# Patient Record
Sex: FEMALE | Race: BLACK OR AFRICAN AMERICAN | NOT HISPANIC OR LATINO | Employment: STUDENT | URBAN - METROPOLITAN AREA
[De-identification: names, ages, dates, MRNs, and addresses within clinical notes are randomized per-mention and may not be internally consistent; named-entity substitution may affect disease eponyms.]

---
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
## 2025-03-21 ENCOUNTER — LAB VISIT (OUTPATIENT)
Dept: LAB | Facility: HOSPITAL | Age: 20
End: 2025-03-21
Payer: MEDICAID

## 2025-03-21 ENCOUNTER — OFFICE VISIT (OUTPATIENT)
Dept: OBSTETRICS AND GYNECOLOGY | Facility: CLINIC | Age: 20
End: 2025-03-21
Payer: MEDICAID

## 2025-03-21 VITALS
SYSTOLIC BLOOD PRESSURE: 118 MMHG | BODY MASS INDEX: 31.61 KG/M2 | HEART RATE: 63 BPM | DIASTOLIC BLOOD PRESSURE: 86 MMHG | HEIGHT: 68 IN | WEIGHT: 208.56 LBS

## 2025-03-21 DIAGNOSIS — Z97.5 NEXPLANON IN PLACE: ICD-10-CM

## 2025-03-21 DIAGNOSIS — Z70.8 ENCOUNTER FOR SEXUALLY TRANSMITTED INFECTION COUNSELING: ICD-10-CM

## 2025-03-21 DIAGNOSIS — Z11.3 SCREENING EXAMINATION FOR VENEREAL DISEASE: Primary | ICD-10-CM

## 2025-03-21 DIAGNOSIS — Z70.8 ENCOUNTER FOR SEXUAL HEALTH EDUCATION: ICD-10-CM

## 2025-03-21 DIAGNOSIS — Z11.3 SCREENING EXAMINATION FOR VENEREAL DISEASE: ICD-10-CM

## 2025-03-21 DIAGNOSIS — B96.89 BACTERIAL VAGINOSIS: ICD-10-CM

## 2025-03-21 DIAGNOSIS — Z30.09 ENCOUNTER FOR OTHER GENERAL COUNSELING OR ADVICE ON CONTRACEPTION: ICD-10-CM

## 2025-03-21 DIAGNOSIS — N76.0 BACTERIAL VAGINOSIS: ICD-10-CM

## 2025-03-21 LAB
HBV SURFACE AG SERPL QL IA: NORMAL
HCV AB SERPL QL IA: NORMAL
HIV 1+2 AB+HIV1 P24 AG SERPL QL IA: NORMAL
TREPONEMA PALLIDUM IGG+IGM AB [PRESENCE] IN SERUM OR PLASMA BY IMMUNOASSAY: NONREACTIVE

## 2025-03-21 PROCEDURE — 87491 CHLMYD TRACH DNA AMP PROBE: CPT

## 2025-03-21 PROCEDURE — 1159F MED LIST DOCD IN RCRD: CPT | Mod: CPTII,,,

## 2025-03-21 PROCEDURE — 81515 NFCT DS BV&VAGINITIS DNA ALG: CPT

## 2025-03-21 PROCEDURE — 3074F SYST BP LT 130 MM HG: CPT | Mod: CPTII,,,

## 2025-03-21 PROCEDURE — 87340 HEPATITIS B SURFACE AG IA: CPT

## 2025-03-21 PROCEDURE — 99212 OFFICE O/P EST SF 10 MIN: CPT | Mod: PBBFAC

## 2025-03-21 PROCEDURE — 99213 OFFICE O/P EST LOW 20 MIN: CPT | Mod: S$PBB,UC,,

## 2025-03-21 PROCEDURE — 86803 HEPATITIS C AB TEST: CPT

## 2025-03-21 PROCEDURE — 86593 SYPHILIS TEST NON-TREP QUANT: CPT

## 2025-03-21 PROCEDURE — 99999 PR PBB SHADOW E&M-EST. PATIENT-LVL II: CPT | Mod: PBBFAC,,,

## 2025-03-21 PROCEDURE — 87389 HIV-1 AG W/HIV-1&-2 AB AG IA: CPT

## 2025-03-21 PROCEDURE — 36415 COLL VENOUS BLD VENIPUNCTURE: CPT

## 2025-03-21 PROCEDURE — 3008F BODY MASS INDEX DOCD: CPT | Mod: CPTII,,,

## 2025-03-21 PROCEDURE — 3079F DIAST BP 80-89 MM HG: CPT | Mod: CPTII,,,

## 2025-03-21 NOTE — PROGRESS NOTES
Subjective:       Patient ID: Glenroy Lamb is a 19 y.o. female.    Chief Complaint:  STD CHECK (Patient denies symptoms)      History of Present Illness  Pt presents to clinic with hx of not using condoms and new sexual partners with unknown sexual history. Pt unsure of what S&S to look for with STI and unsure which ones can be prevented with condom use. Pt has a nexplanon in place and knows it expires this year but unsure of what month. Would like to take this one out and replace it with another. Pt denies dysuria and abnormal odor or dx but would like both blood work and vaginal swabs for std testing.          GYN & OB History  Patient's last menstrual period was 2025 (approximate).   Date of Last Pap: 3/8/2022    OB History    Para Term  AB Living   1 1 1   1   SAB IAB Ectopic Multiple Live Births      0 1      # Outcome Date GA Lbr Oliver/2nd Weight Sex Type Anes PTL Lv   1 Term 22 37w2d / 00:36 3.045 kg (6 lb 11.4 oz) M Vag-Spont EPI N RICH      Birth Comments:  RR 50 at 1 minute of life. Matching ID bracelets placed on infant x2, mother, and father       Review of Systems  Review of Systems   Constitutional:  Positive for activity change.   HENT: Negative.     Eyes: Negative.    Respiratory: Negative.     Cardiovascular: Negative.    Gastrointestinal: Negative.    Genitourinary: Negative.    Musculoskeletal: Negative.    Skin: Negative.    Neurological: Negative.    Psychiatric/Behavioral: Negative.             Objective:    Physical Exam:   Constitutional: She is oriented to person, place, and time. She appears well-developed and well-nourished. She is cooperative. She does not appear ill. No distress.    HENT:   Head: Normocephalic.    Eyes: Conjunctivae and EOM are normal.     Cardiovascular:  Normal rate, regular rhythm and normal heart sounds.             Pulmonary/Chest: Effort normal. No respiratory distress.        Abdominal: Soft. Bowel sounds are normal. She exhibits  no distension. There is no abdominal tenderness. There is no rebound and no guarding.     Genitourinary:    Vagina and rectum normal.      Pelvic exam was performed with patient supine.   There is no rash or lesion on the right labia. There is no rash or lesion on the left labia. Right adnexum displays no mass and no tenderness. Left adnexum displays no mass and no tenderness. No vaginal discharge or tenderness in the vagina. Cervix exhibits no motion tenderness. Uterus is not enlarged and not tender.    Genitourinary Comments: Upon speculum exam vaginal dx appears normal and physiologic, clear to white mucus.              Musculoskeletal: Normal range of motion.       Neurological: She is alert and oriented to person, place, and time.    Skin: Skin is warm and dry. No rash noted.    Psychiatric: She has a normal mood and affect. Her speech is normal and behavior is normal. Mood, affect, judgment and thought content normal.          Assessment:        1. Screening examination for venereal disease    2. Encounter for sexually transmitted infection counseling    3. Encounter for sexual health education    4. Encounter for other general counseling or advice on contraception    5. Nexplanon in place               Plan:      Glenroy was seen today for std check.    Diagnoses and all orders for this visit:    Screening examination for venereal disease  -     Vaginosis Screen by DNA Probe; Future  -     C. trachomatis/N. gonorrhoeae by AMP DNA [facility]; Cervix  -     Vaginosis Screen by DNA Probe  -     HIV 1/2 Ag/Ab (4th Gen); Future  -     Hepatitis B Surface Antigen; Standing  -     Hepatitis C Antibody; Future  -     Treponema Pallidium Antibodies IgG, IgM; Standing    Encounter for sexually transmitted infection counseling    Encounter for sexual health education    Encounter for other general counseling or advice on contraception    Nexplanon in place      Will order and schedule removal and reinsertion of  nexplanon.    Will contact pt in regards of sti testing results and tx accordingly.    Pt was counseled on proper vaginal health and hygiene as well as STI prevention with condom use.

## 2025-03-22 LAB
BACTERIAL VAGINOSIS DNA: DETECTED
C TRACH DNA SPEC QL NAA+PROBE: NOT DETECTED
CANDIDA GLABRATA/KRUSEI: NOT DETECTED
CANDIDA RRNA VAG QL PROBE: NOT DETECTED
N GONORRHOEA DNA SPEC QL NAA+PROBE: NOT DETECTED
TRICHOMONAS VAGINALIS: NOT DETECTED

## 2025-03-24 RX ORDER — METRONIDAZOLE 500 MG/1
500 TABLET ORAL EVERY 12 HOURS
Qty: 14 TABLET | Refills: 0 | Status: SHIPPED | OUTPATIENT
Start: 2025-03-24 | End: 2025-03-31

## 2025-04-08 ENCOUNTER — TELEPHONE (OUTPATIENT)
Dept: OBSTETRICS AND GYNECOLOGY | Facility: CLINIC | Age: 20
End: 2025-04-08
Payer: MEDICAID

## 2025-04-08 DIAGNOSIS — Z30.46 ENCOUNTER FOR REMOVAL AND REINSERTION OF NEXPLANON: Primary | ICD-10-CM

## 2025-04-08 NOTE — TELEPHONE ENCOUNTER
----- Message from Nurse Arroyo sent at 4/8/2025  2:11 PM CDT -----  Patient needs to be scheduled for Nexplanon removal and reinsertion.

## 2025-06-29 ENCOUNTER — HOSPITAL ENCOUNTER (EMERGENCY)
Facility: HOSPITAL | Age: 20
Discharge: HOME | End: 2025-06-29
Attending: SURGERY
Payer: MEDICAID

## 2025-06-29 VITALS
HEIGHT: 68 IN | BODY MASS INDEX: 32.18 KG/M2 | OXYGEN SATURATION: 100 % | HEART RATE: 75 BPM | WEIGHT: 212.31 LBS | DIASTOLIC BLOOD PRESSURE: 100 MMHG | TEMPERATURE: 98 F | SYSTOLIC BLOOD PRESSURE: 149 MMHG | RESPIRATION RATE: 18 BRPM

## 2025-06-29 DIAGNOSIS — Z20.2 POSSIBLE EXPOSURE TO STD: Primary | ICD-10-CM

## 2025-06-29 LAB
B-HCG UR QL: NEGATIVE
BILIRUB UR QL STRIP.AUTO: NEGATIVE
CLARITY UR: ABNORMAL
COLOR UR AUTO: YELLOW
GLUCOSE UR QL STRIP: NEGATIVE
HGB UR QL STRIP: NEGATIVE
KETONES UR QL STRIP: NEGATIVE
LEUKOCYTE ESTERASE UR QL STRIP: NEGATIVE
NITRITE UR QL STRIP: NEGATIVE
PH UR STRIP: 7 [PH]
PROT UR QL STRIP: NEGATIVE
SP GR UR STRIP: 1.02
UROBILINOGEN UR STRIP-ACNC: 1 EU/DL

## 2025-06-29 PROCEDURE — 81025 URINE PREGNANCY TEST: CPT | Performed by: NURSE PRACTITIONER

## 2025-06-29 PROCEDURE — 81003 URINALYSIS AUTO W/O SCOPE: CPT | Performed by: NURSE PRACTITIONER

## 2025-06-29 PROCEDURE — 81515 NFCT DS BV&VAGINITIS DNA ALG: CPT | Performed by: NURSE PRACTITIONER

## 2025-06-29 PROCEDURE — 87491 CHLMYD TRACH DNA AMP PROBE: CPT | Performed by: NURSE PRACTITIONER

## 2025-06-29 PROCEDURE — 96372 THER/PROPH/DIAG INJ SC/IM: CPT | Performed by: NURSE PRACTITIONER

## 2025-06-29 PROCEDURE — 63600175 PHARM REV CODE 636 W HCPCS: Performed by: NURSE PRACTITIONER

## 2025-06-29 PROCEDURE — 99284 EMERGENCY DEPT VISIT MOD MDM: CPT | Mod: 25

## 2025-06-29 RX ORDER — DOXYCYCLINE 100 MG/1
100 CAPSULE ORAL 2 TIMES DAILY
Qty: 20 CAPSULE | Refills: 0 | Status: SHIPPED | OUTPATIENT
Start: 2025-06-29 | End: 2025-07-09

## 2025-06-29 RX ORDER — CEFTRIAXONE 1 G/1
1 INJECTION, POWDER, FOR SOLUTION INTRAMUSCULAR; INTRAVENOUS
Status: COMPLETED | OUTPATIENT
Start: 2025-06-29 | End: 2025-06-29

## 2025-06-29 RX ORDER — FLUCONAZOLE 150 MG/1
150 TABLET ORAL ONCE
Qty: 1 TABLET | Refills: 0 | Status: SHIPPED | OUTPATIENT
Start: 2025-06-29 | End: 2025-06-29

## 2025-06-29 RX ORDER — METRONIDAZOLE 500 MG/1
500 TABLET ORAL EVERY 12 HOURS
Qty: 14 TABLET | Refills: 0 | Status: SHIPPED | OUTPATIENT
Start: 2025-06-29 | End: 2025-07-06

## 2025-06-29 RX ADMIN — CEFTRIAXONE SODIUM 1 G: 1 INJECTION, POWDER, FOR SOLUTION INTRAMUSCULAR; INTRAVENOUS at 05:06

## 2025-06-29 NOTE — ED PROVIDER NOTES
Encounter Date: 2025       History     Chief Complaint   Patient presents with    Vaginal Discharge    Vaginal Itching     Glenroy Lamb is a 19 y.o. female with no significant PMH presenting to the ED for evaluation of vaginal discharge.  Patient presents with a 4 day history of a thin, white vaginal discharge with odor.  She is concerned that she has bacterial vaginosis.  She is also requesting STD testing for chlamydia and gonorrhea due to recent sexual intercourse without protection.  She denies vaginal lesions or pain.  Denies abdominal pain.  She does not believe that she is pregnant.    The history is provided by the patient.     Review of patient's allergies indicates:  No Known Allergies  Past Medical History:   Diagnosis Date    History of 2019 novel coronavirus disease (COVID-19) 2022    Pre-eclampsia in third trimester 2022    Pregnant          History reviewed. No pertinent surgical history.  Family History   Problem Relation Name Age of Onset    Heart disease Maternal Grandmother      Diabetes Maternal Grandmother      Stroke Maternal Grandfather      Heart disease Mother          PALPITATIONS    Breast cancer Neg Hx      Colon cancer Neg Hx      Ovarian cancer Neg Hx       Social History[1]  Review of Systems   Constitutional:  Negative for activity change, chills and fever.   HENT:  Negative for congestion, ear discharge, ear pain, postnasal drip, sinus pressure, sinus pain and sore throat.    Respiratory:  Negative for cough, chest tightness and shortness of breath.    Cardiovascular:  Negative for chest pain.   Gastrointestinal:  Negative for abdominal distention, abdominal pain and nausea.   Genitourinary:  Positive for vaginal discharge. Negative for dysuria, frequency and urgency.   Musculoskeletal:  Negative for back pain.   Skin:  Negative for rash.   Neurological:  Negative for dizziness, weakness, light-headedness and numbness.   Hematological:  Does not bruise/bleed  easily.       Physical Exam     Initial Vitals [06/29/25 1628]   BP Pulse Resp Temp SpO2   (!) 148/91 83 18 98.2 °F (36.8 °C) 98 %      MAP       --         Physical Exam    Nursing note and vitals reviewed.  Constitutional: She appears well-developed and well-nourished.   HENT:   Head: Normocephalic and atraumatic.   Eyes: Conjunctivae and EOM are normal. Pupils are equal, round, and reactive to light.   Neck: Neck supple.   Cardiovascular:  Normal rate, regular rhythm, normal heart sounds and intact distal pulses.           Pulmonary/Chest: Breath sounds normal.   Abdominal: Abdomen is soft. Bowel sounds are normal.   Musculoskeletal:         General: Normal range of motion.      Cervical back: Neck supple.     Neurological: She is alert and oriented to person, place, and time. She has normal strength.   Skin: Skin is warm and dry.   Psychiatric: She has a normal mood and affect. Her behavior is normal. Judgment and thought content normal.         ED Course   Procedures  Labs Reviewed   URINALYSIS, REFLEX TO URINE CULTURE - Abnormal       Result Value    Color, UA Yellow      Appearance, UA Hazy (*)     pH, UA 7.0      Spec Grav UA 1.025      Protein, UA Negative      Glucose, UA Negative      Ketones, UA Negative      Bilirubin, UA Negative      Blood, UA Negative      Nitrites, UA Negative      Urobilinogen, UA 1.0      Leukocyte Esterase, UA Negative     PREGNANCY TEST, URINE RAPID - Normal    hCG Qualitative, Urine Negative     C. TRACHOMATIS/N. GONORRHOEAE BY AMP DNA   VAGINOSIS SCREEN BY DNA PROBE   GREY TOP URINE HOLD          Imaging Results    None          Medications   cefTRIAXone injection 1 g (1 g Intramuscular Given 6/29/25 1720)     Medical Decision Making  Evaluation of a 19-year-old female presenting with vaginal discharge with odor and concerns for STD.  Presents nontoxic appearing with stable vital signs.  Abdomen is soft and non tender   exam deferred due to patient request    Differential  diagnosis includes bacterial vaginosis, vaginitis, yeast, STD    Amount and/or Complexity of Data Reviewed  Labs: ordered. Decision-making details documented in ED Course.    Risk  Prescription drug management.  Risk Details: Stable for discharge home.  Negative urinalysis.  Gonorrhea and chlamydia pending in addition to vaginosis screening.  Given symptoms, will discharge with Flagyl, doxycycline, and Diflucan.  Patient also given Rocephin injection to cover chlamydia/gonorrhea infection.  Patient instructed to avoid sexual intercourse pending results and treatment. Patient/caregiver voices understanding and feels comfortable with discharge plan.      The patient acknowledges that close follow up with medical provider is required. Instructed to follow up with PCP within 2 days. Patient was given specific return precautions. The patient agrees to comply with all instruction and directions given in the ER.                                        Clinical Impression:  Final diagnoses:  [Z20.2] Possible exposure to STD (Primary)          ED Disposition Condition    Discharge Stable          ED Prescriptions       Medication Sig Dispense Start Date End Date Auth. Provider    doxycycline (VIBRAMYCIN) 100 MG Cap Take 1 capsule (100 mg total) by mouth 2 (two) times daily. for 10 days 20 capsule 6/29/2025 7/9/2025 Camila Lacy NP    metroNIDAZOLE (FLAGYL) 500 MG tablet Take 1 tablet (500 mg total) by mouth every 12 (twelve) hours. for 7 days 14 tablet 6/29/2025 7/6/2025 Camila Lacy NP    fluconazole (DIFLUCAN) 150 MG Tab (Expires today) Take 1 tablet (150 mg total) by mouth once. for 1 dose 1 tablet 6/29/2025 6/29/2025 Camila Lacy NP          Follow-up Information       Follow up With Specialties Details Why Contact Info    Cinthia Logan MD Pediatrics Schedule an appointment as soon as possible for a visit in 2 days  [address] [facility] [address] [phone]                     [1]   Social  History  Tobacco Use    Smoking status: Never    Smokeless tobacco: Never   Substance Use Topics    Alcohol use: Not Currently    Drug use: Never        Camila Lacy NP  06/29/25 3007

## 2025-06-30 LAB — HOLD SPECIMEN: NORMAL

## 2025-07-01 LAB
BACTERIAL VAGINOSIS DNA (OHS): DETECTED
CANDIDA GLABRATA/KRUSEI DNA (OHS): NOT DETECTED
CANDIDA SPECIES DNA (OHS): NOT DETECTED
TRICHOMONAS VAGINALIS DNA (OHS): NOT DETECTED

## 2025-07-03 LAB
C TRACH DNA SPEC QL NAA+PROBE: NOT DETECTED
CTGC SOURCE (OHS) ORD-325: NORMAL
N GONORRHOEA DNA UR QL NAA+PROBE: NOT DETECTED